# Patient Record
Sex: FEMALE | URBAN - METROPOLITAN AREA
[De-identification: names, ages, dates, MRNs, and addresses within clinical notes are randomized per-mention and may not be internally consistent; named-entity substitution may affect disease eponyms.]

---

## 2017-08-16 ENCOUNTER — OFFICE VISIT (OUTPATIENT)
Dept: OBGYN | Facility: CLINIC | Age: 27
End: 2017-08-16
Payer: COMMERCIAL

## 2017-08-16 VITALS
BODY MASS INDEX: 19.45 KG/M2 | HEART RATE: 66 BPM | HEIGHT: 62 IN | SYSTOLIC BLOOD PRESSURE: 91 MMHG | DIASTOLIC BLOOD PRESSURE: 61 MMHG | WEIGHT: 105.7 LBS

## 2017-08-16 DIAGNOSIS — Z31.69 ENCOUNTER FOR PRECONCEPTION CONSULTATION: Primary | ICD-10-CM

## 2017-08-16 PROCEDURE — 87491 CHLMYD TRACH DNA AMP PROBE: CPT | Performed by: OBSTETRICS & GYNECOLOGY

## 2017-08-16 PROCEDURE — 87591 N.GONORRHOEAE DNA AMP PROB: CPT | Performed by: OBSTETRICS & GYNECOLOGY

## 2017-08-16 RX ORDER — PRENATAL VIT/IRON FUM/FOLIC AC 27MG-0.8MG
1 TABLET ORAL DAILY
Qty: 100 TABLET | Refills: 3 | Status: SHIPPED | OUTPATIENT
Start: 2017-08-16

## 2017-08-16 ASSESSMENT — ANXIETY QUESTIONNAIRES
7. FEELING AFRAID AS IF SOMETHING AWFUL MIGHT HAPPEN: NOT AT ALL
GAD7 TOTAL SCORE: 0
1. FEELING NERVOUS, ANXIOUS, OR ON EDGE: NOT AT ALL
3. WORRYING TOO MUCH ABOUT DIFFERENT THINGS: NOT AT ALL
5. BEING SO RESTLESS THAT IT IS HARD TO SIT STILL: NOT AT ALL
6. BECOMING EASILY ANNOYED OR IRRITABLE: NOT AT ALL
2. NOT BEING ABLE TO STOP OR CONTROL WORRYING: NOT AT ALL

## 2017-08-16 ASSESSMENT — PATIENT HEALTH QUESTIONNAIRE - PHQ9
5. POOR APPETITE OR OVEREATING: NOT AT ALL
SUM OF ALL RESPONSES TO PHQ QUESTIONS 1-9: 0

## 2017-08-16 ASSESSMENT — PAIN SCALES - GENERAL: PAINLEVEL: NO PAIN (0)

## 2017-08-16 NOTE — MR AVS SNAPSHOT
After Visit Summary   2017    Yvette Key    MRN: [id number]           Patient Information     Date Of Birth          1990        Visit Information        Provider Department      2017 2:15 PM Viki Melendez MD [facility]        Today's Diagnoses     Encounter for preconception consultation    -  1      Care Instructions    Today we did chlamydia and gonorrhea testing, and you can go to the lab for syphilis and HIV testing, as well as rubella testing.      prescription for prenatal vitamins with DHA at your normal pharmacy.     Viki Melendez MD  OB/Gyn  2017, 3:22 PM                  Follow-ups after your visit        Who to contact     Please call your clinic at [phone] to:    Ask questions about your health    Make or cancel appointments    Discuss your medicines    Learn about your test results    Speak to your doctor   If you have compliments or concerns about an experience at your clinic, or if you wish to file a complaint, please contact [facility] Patient Relations at [phone] or email us at [email or web address]         Additional Information About Your Visit        MyChart Information     [employer] is an electronic gateway that provides easy, online access to your medical records. With [employer]verbug, you can request a clinic appointment, read your test results, renew a prescription or communicate with your care team.     To sign up for [employer] visit the website at [email or web address]   You will be asked to enter the access code listed below, as well as some personal information. Please follow the directions to create your username and password.     Your access code is: [email or web address]  Expires: 2017  6:30 AM     Your access code will  in 90 days. If you need help or a new code, please contact your [facility] or call [phone] for assistance.        Care EveryWhere  "ID     This is your Care EveryWhere ID. This could be used by other organizations to access your [facility] medical records  [id number]        Your Vitals Were     Pulse Height Last Period BMI (Body Mass Index)          66 1.58 m (5' 2.21\") 08/12/2017 19.21 kg/m2         Blood Pressure from Last 3 Encounters:   08/16/17 91/61    Weight from Last 3 Encounters:   08/16/17 47.9 kg (105 lb 11.2 oz)              We Performed the Following     Anti Treponema     Chlamydia PCR (Clinic Collect)     Gonorrhea PCR     HIV Antigen Antibody Combo     Rubella Antibody IgG Quantitative          Today's Medication Changes          These changes are accurate as of: 8/16/17  3:26 PM.  If you have any questions, ask your nurse or doctor.               Start taking these medicines.        Dose/Directions    prenatal multivitamin plus iron 27-0.8 MG Tabs per tablet   Used for:  Encounter for preconception consultation   Started by:  Viki Melendez MD        Dose:  1 tablet   Take 1 tablet by mouth daily   Quantity:  100 tablet   Refills:  3            Where to get your medicines      These medications were sent to [facility]/pharmacy #[id number] - [place], [place] - [address] [place] [address]  [address] [facility] [address]     Phone:  [phone]     prenatal multivitamin plus iron 27-0.8 MG Tabs per tablet                Primary Care Provider    None Specified       No primary provider on file.        Equal Access to Services     NIMESH DEGROOT AH: Bruce dee Soteddy, waaxda luqadaha, qaybta kaalmada adeegyada, stephani carlton. So [facility] [phone].    ATENCIÓN: Si habla español, tiene a alexander disposición servicios gratuitos de asistencia lingüística. Llame al [phone].    We comply with applicable federal civil rights laws and [place] laws. We do not discriminate on the basis of race, color, national origin, age, disability sex, sexual orientation or gender identity.            Thank you!     Thank you " for choosing [facility]  for your care. Our goal is always to provide you with excellent care. Hearing back from our patients is one way we can continue to improve our services. Please take a few minutes to complete the written survey that you may receive in the mail after your visit with us. Thank you!             Your Updated Medication List - Protect others around you: Learn how to safely use, store and throw away your medicines at www.disposemymeds.org.          This list is accurate as of: 8/16/17  3:26 PM.  Always use your most recent med list.                   Brand Name Dispense Instructions for use Diagnosis    prenatal multivitamin plus iron 27-0.8 MG Tabs per tablet     100 tablet    Take 1 tablet by mouth daily    Encounter for preconception consultation

## 2017-08-16 NOTE — PATIENT INSTRUCTIONS
Today we did chlamydia and gonorrhea testing, and you can go to the lab for syphilis and HIV testing, as well as rubella testing.      prescription for prenatal vitamins with DHA at your normal pharmacy.     Viki Melendez MD  OB/Gyn  8/16/2017, 3:22 PM

## 2017-08-17 LAB
C TRACH DNA SPEC QL NAA+PROBE: NEGATIVE
N GONORRHOEA DNA SPEC QL NAA+PROBE: NEGATIVE
SPECIMEN SOURCE: NORMAL
SPECIMEN SOURCE: NORMAL

## 2017-08-17 ASSESSMENT — ANXIETY QUESTIONNAIRES: GAD7 TOTAL SCORE: 0

## 2017-08-29 NOTE — PROGRESS NOTES
"Preconception visit  Yvette Key, [id number]  8/16/2017    CC: Desires pregnancy    HPI: Ms Key is a 26 year old G0 who comes in to discuss and for an annual exam.     Sexually active with a single male partner, her . He lives in [place]. She sees him only occasionally so she wants to get the timing just right. She is already timing menses on an twan and has done some reading about timing intercourse with ovulation.     Last Pap summer 2016, NILM per patient. No history of STIs. No abortions, miscarriages, ectopic pregnancies. No pelvic surgeries or procedures.     Nonsmoker, no EtOH. No drugs. Is not yet taking PNV. Active with school and work.     Traveled to [place] for a recent vacation this month. No mosquito bites, colds or fevers.     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     No family history of birth defects or inherited disorders.     Exam:   Patient Vitals for the past 8 hrs:   BP Pulse Height Weight   08/16/17 1423 91/61 66 1.58 m (5' 2.21\") 47.9 kg (105 lb 11.2 oz)     Gen: slender, appears well, NAD  Breasts: Small, symmetric, with rubbery mobile glands at 9 o'clock on right breast but no discreet masses, lymphadenopathy, or skin abnormalities. No nipple discharge.   Pelvic: Normal appearing external genitalia, vagina and cervix. Small mobile anteverted uterus. No adnexal masses palpated.     A/P  26 year old G0 for preconception counseling. Very healthy, with minimal risk factors other than very minimal risk of exposure to Zika during recent travels.     - Ordered PNV, discussed this and DHA intake  - GC/CT performed, ordered Rubella laura as well as syphilis and HIV at patient request  - Discussed Zika area avoidance in coming travels; strict interpretation of guidelines recommends that she wait 8 weeks before attempting pregnancy.     Viki Melendez, PGY3  OB/Gyn  8/16/2017, 6:45 PM  I agree with note as above.  " Assessment and plan were jointly made.  Maggie Corona MD

## 2017-10-23 ENCOUNTER — TELEPHONE (OUTPATIENT)
Dept: OBGYN | Facility: CLINIC | Age: 27
End: 2017-10-23

## 2017-10-23 DIAGNOSIS — Z34.91 NORMAL PREGNANCY IN FIRST TRIMESTER: Primary | ICD-10-CM

## 2017-10-23 NOTE — TELEPHONE ENCOUNTER
"Pt called with questions related to prenatal care in first trimester. Pt reported LMP 9/9/17. Patient having increased nausea with prenatal vitamins. Pt also concerned that she may be getting a \"double dose\" because she is small in weight. Encouraged pt to try at different times of day and with or without food to see if she is better able to tolerate the prenatals.  Also encouraged that she try another formulation, such as gummy vitamins, but pt wants to take what is covered by insurance.     Pt wondering necessity of intake U/S. Pt informed that intake U/S is completed to determine dating of pregnancy and location.    Pt had no further questions and was sent to  to schedule intake.   "

## 2017-10-23 NOTE — TELEPHONE ENCOUNTER
Pt's LMP is 9/9/17 pt is currently experiencing all the symptoms of nausea, vomiting, and breast tenderness. This is her first pregnancy and she is currently taking prenatal vitamins and is scheduled for 10/13/17 @ 9:30am.

## 2018-01-07 ENCOUNTER — HEALTH MAINTENANCE LETTER (OUTPATIENT)
Age: 28
End: 2018-01-07

## 2020-03-10 ENCOUNTER — HEALTH MAINTENANCE LETTER (OUTPATIENT)
Age: 30
End: 2020-03-10

## 2020-12-27 ENCOUNTER — HEALTH MAINTENANCE LETTER (OUTPATIENT)
Age: 30
End: 2020-12-27

## 2021-04-24 ENCOUNTER — HEALTH MAINTENANCE LETTER (OUTPATIENT)
Age: 31
End: 2021-04-24

## 2021-10-09 ENCOUNTER — HEALTH MAINTENANCE LETTER (OUTPATIENT)
Age: 31
End: 2021-10-09

## 2022-05-16 ENCOUNTER — HEALTH MAINTENANCE LETTER (OUTPATIENT)
Age: 32
End: 2022-05-16

## 2022-09-11 ENCOUNTER — HEALTH MAINTENANCE LETTER (OUTPATIENT)
Age: 32
End: 2022-09-11

## 2023-06-03 ENCOUNTER — HEALTH MAINTENANCE LETTER (OUTPATIENT)
Age: 33
End: 2023-06-03